# Patient Record
Sex: FEMALE | URBAN - METROPOLITAN AREA
[De-identification: names, ages, dates, MRNs, and addresses within clinical notes are randomized per-mention and may not be internally consistent; named-entity substitution may affect disease eponyms.]

---

## 2023-08-28 ENCOUNTER — PROCEDURE VISIT (OUTPATIENT)
Dept: SURGERY | Age: 43
End: 2023-08-28
Payer: COMMERCIAL

## 2023-08-28 VITALS — DIASTOLIC BLOOD PRESSURE: 80 MMHG | HEART RATE: 76 BPM | SYSTOLIC BLOOD PRESSURE: 136 MMHG

## 2023-08-28 DIAGNOSIS — C44.319 BASAL CELL CARCINOMA OF LEFT FOREHEAD: Primary | ICD-10-CM

## 2023-08-28 PROCEDURE — 17311 MOHS 1 STAGE H/N/HF/G: CPT | Performed by: DERMATOLOGY

## 2023-08-28 PROCEDURE — 12052 INTMD RPR FACE/MM 2.6-5.0 CM: CPT | Performed by: DERMATOLOGY

## 2023-08-28 PROCEDURE — 17312 MOHS ADDL STAGE: CPT | Performed by: DERMATOLOGY

## 2023-08-28 RX ORDER — ESCITALOPRAM OXALATE 20 MG/1
20 TABLET ORAL DAILY
COMMUNITY

## 2023-08-28 RX ORDER — DEXTROAMPHETAMINE SACCHARATE, AMPHETAMINE ASPARTATE, DEXTROAMPHETAMINE SULFATE AND AMPHETAMINE SULFATE 1.25; 1.25; 1.25; 1.25 MG/1; MG/1; MG/1; MG/1
5 TABLET ORAL DAILY PRN
COMMUNITY

## 2023-08-28 RX ORDER — DEXTROAMPHETAMINE SACCHARATE, AMPHETAMINE ASPARTATE MONOHYDRATE, DEXTROAMPHETAMINE SULFATE AND AMPHETAMINE SULFATE 6.25; 6.25; 6.25; 6.25 MG/1; MG/1; MG/1; MG/1
25 CAPSULE, EXTENDED RELEASE ORAL EVERY MORNING
COMMUNITY

## 2023-08-28 NOTE — PATIENT INSTRUCTIONS
sunscreen daily- even in winter    Call us at [phone] right away if you have any of the following symptoms:  -Bleeding that you can not stop (see highlighted area above). -Pain that lasts longer than 48 hours.  -Your wound becomes more painful, red or hot. -Bruising and swelling that does not begin to improve within the 48 hours or gets worse suddenly.

## 2023-08-28 NOTE — PROGRESS NOTES
PRE-PROCEDURE SCREENING    Pacemaker/ICD: No  Difficulty with numbing in the past: No  Local Anesthesia Reaction/passing out: No  Latex or adhesive allergy:  No  Bleeding/Clotting Disorders: No  Anticoagulant Therapy: No  Joint prosthesis: No  Artificial Heart Valve: No  Stroke or Seizures: No  Organ Transplant or Lymphoma: No  Immunosuppression: No  Respiratory Problems: No
The clinically-apparent tumor was carefully defined and debulked, determining the edge of the surgical excision. A thin layer of tumor-laden tissue was excised with a narrow margin of normal-appearing skin, using the technique of Mohs. A map was prepared to correspond to the area of skin from which it was excised. Hemostasis was achieved using electrosurgery. The wound was bandaged. The tissue was prepared for the cryostat and sectioned. 1 section(s) prepared. Each section was coded, cut, and stained for microscopic examination. The entire base and margins of the excised piece of tissue were examined by the surgeon. The tissue was examined to the level of subcutaneous fat. Stage I: Superficial BCC: isolated basaloid lobules projecting from the lower margin of the epidermis and Nodular BCC: large basaloid lobules of varying shape and size with peripheral palisading present around the rim of the lobule, with retraction of the tumor lobules from their associated stroma. The tumor is present to the level of dermis. The remaining tumor was noted and the next stage was performed. Stage II:  A thin layer of tissue was removed at the histologically-identified sites of remaining tumor. The entire procedure as described in stage I was repeated to process the tissue according to Mohs technique. 2 section(s) prepared for stage II on 1 tissue block. No tumor was identified at the peripheral margins of stage II of microscopically controlled surgery. DEFECT MANAGEMENT:    REPAIR DESCRIPTION:  Various closure modalities were discussed with the patient, and it was decided that an intermediate layered repair would best preserve normal anatomic and functional relationships. Additional risk of wound dehiscence was discussed.      The area was anesthetized with 1% lidocaine with epinephrine 1:100,000 buffered, was given a sterile prep using Chlorhexidine gluconate 4% solution and draped in the

## 2023-08-29 ENCOUNTER — TELEPHONE (OUTPATIENT)
Dept: SURGERY | Age: 43
End: 2023-08-29

## 2023-08-29 NOTE — TELEPHONE ENCOUNTER
The patient was in the office 8/28/23 for mohs located on the left lateral forehead with ILC repair. The patient tolerated the procedure well and left the office in good condition. A post-operative telephone call was placed at 12:55 pm on 8/29/23 in order to check on the patient's recovery process. The patient was not able to be reached and a phone message was left.

## 2023-09-07 ENCOUNTER — TELEPHONE (OUTPATIENT)
Dept: SURGERY | Age: 43
End: 2023-09-07

## 2023-09-07 RX ORDER — CEPHALEXIN 500 MG/1
500 CAPSULE ORAL 3 TIMES DAILY
Qty: 15 CAPSULE | Refills: 0 | Status: SHIPPED | OUTPATIENT
Start: 2023-09-07 | End: 2023-09-12

## 2023-09-07 NOTE — TELEPHONE ENCOUNTER
Patient states that starting a few days ago incision on left lateral forehead from 8/28/23 has become red, warm to touch, and having purulent drainage from site. She states she feels like it is infected and coworkers in medical field agree. Informed patient that sometimes areas need to be drained if infected and Dr. Codi Fuchs able to see patient today. Patient states it has not drained at all today and doesn't think she needs to come in and she also lives an hour away. Patient [facility] [address] in [place], [place].

## 2023-09-07 NOTE — TELEPHONE ENCOUNTER
Left message for patient explaining that Dr. Crystal Pulling sent in prescription for keflex to pharmacy and to call office with any questions or concerns.

## 2023-09-07 NOTE — TELEPHONE ENCOUNTER
Patient had surgery on 8/28 on forehead and states the stitches are infected and wanted to know if Dr Gianluca Olivas can send in an antibiotic.  Pt says she stays an hour away

## 2025-07-07 NOTE — TELEPHONE ENCOUNTER
Left message for patient to return call to gather more information regarding surgery site from 8/28/23. On Saturday, 7/5/2025, I spoke with [facility] agent, Annabelle Hoyos to review plan for SNf .  Updated her on the two referrals that had been sent and were denied.  She wanted referrals sent to [facility] and [facility].  Referrals were initiated on Saturday.    Today, I called Both agencies to ask about ability to accept.  Message left for [facility] [phone] asking for returned call.  Call placed to [facility]; message left asking for returned call.    HERNANDEZ Salinas     Case Management   [phone]    7/7/2025  10:37 AM     Size Of Lesion In Cm (Optional): 0 Detail Level: Detailed Morphology Per Location (Optional): Desires no intervention recheck every visit. Morphology Per Location (Optional): Desires no treatment recheck every visit.